# Patient Record
Sex: FEMALE | HISPANIC OR LATINO | Employment: FULL TIME | URBAN - METROPOLITAN AREA
[De-identification: names, ages, dates, MRNs, and addresses within clinical notes are randomized per-mention and may not be internally consistent; named-entity substitution may affect disease eponyms.]

---

## 2021-07-30 ENCOUNTER — TELEPHONE (OUTPATIENT)
Dept: CARDIOLOGY | Facility: MEDICAL CENTER | Age: 36
End: 2021-07-30

## 2021-07-30 NOTE — TELEPHONE ENCOUNTER
Spoke to patient in regards to obtaining records for NP appointment with [facility]. Per patient has never been treated by a previous cardiologist. Confirmed all recent notes, labs, and cardiac imaging are in [facility]. Confirmed with patient appointment time, location, and date.    Per [facility] notes EKG was done in [facility]. Request sent for all recent labs and EKG. Per pt would also like to know how much her visit would be. Informed pt message would get routed to front staff for answer.    Pending records.

## 2021-08-03 ENCOUNTER — OFFICE VISIT (OUTPATIENT)
Dept: CARDIOLOGY | Facility: MEDICAL CENTER | Age: 36
End: 2021-08-03

## 2021-08-03 VITALS
RESPIRATION RATE: 14 BRPM | DIASTOLIC BLOOD PRESSURE: 60 MMHG | HEART RATE: 73 BPM | WEIGHT: 142.7 LBS | HEIGHT: 64 IN | OXYGEN SATURATION: 100 % | SYSTOLIC BLOOD PRESSURE: 110 MMHG | BODY MASS INDEX: 24.36 KG/M2

## 2021-08-03 DIAGNOSIS — R00.2 PALPITATIONS: ICD-10-CM

## 2021-08-03 LAB — EKG IMPRESSION: NORMAL

## 2021-08-03 PROCEDURE — 93000 ELECTROCARDIOGRAM COMPLETE: CPT | Performed by: INTERNAL MEDICINE

## 2021-08-03 PROCEDURE — 99204 OFFICE O/P NEW MOD 45 MIN: CPT | Performed by: INTERNAL MEDICINE

## 2021-08-03 RX ORDER — METOPROLOL SUCCINATE 25 MG/1
25 TABLET, EXTENDED RELEASE ORAL DAILY
Qty: 30 TABLET | Refills: 11 | Status: SHIPPED | OUTPATIENT
Start: 2021-08-03

## 2021-08-03 NOTE — PROGRESS NOTES
Cardiology Initial Consultation Note    Date of note:    8/3/2021    Primary Care Provider: Sary Bydr P.A.-C.  Referring Provider: Sary Byrd P.A.-C.     Patient Name: Selena Judge   YOB: 1985  MRN:              [id number]    Chief Complaint: Palpitations    Selena Hernadez is a 36 y.o. female  patient presented today with complaints of palpitations happening for last several days.  Palpitations feels like heart racing, happens throughout the day, associated with fatigue.  She works at night, she gets poor sleep.  No syncope.  Her son has valve problem needing to check every 2 years.  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    ROS    All other systems reviewed and discussed using a comprehensive questionnaire and are negative.     Past medical history, family history, social history, allergies and labs are reviewed and updated as needed as documented below.    Past Medical History:   Diagnosis Date   • Anemia     at age 17   • Depression     currently    • First trimester bleeding 12/30/2014   • Headache(784.0)    • Hypertension 2007    High blood pressure at delivery   • Suicidal ideation 2012   • Urinary tract infection, site not specified     Hx of UTI 11/10/14         Past Surgical History:   Procedure Laterality Date   • REMOVAL OF RETAINED PLACENTA N/A 6/18/2015    Procedure: REMOVAL OF RETAINED PLACENTA;  Surgeon: Gita Cassidy M.D.;  Location: LABOR AND DELIVERY;  Service:          Current Outpatient Medications   Medication Sig Dispense Refill   • metoprolol SR (TOPROL XL) 25 MG TABLET SR 24 HR Take 1 tablet by mouth every day. 30 tablet 11   • ibuprofen (MOTRIN) 800 MG TABS Take 1 Tab by mouth every 8 hours as needed (Cramping). 30 Tab 1   • prenatal multivitamin (STUARTNATAL 1+1) 27-1 MG TABS Take 1 Tab by mouth every morning. (Patient not taking: Reported on 8/3/2021)       No current facility-administered  "medications for this visit.         No Known Allergies      History reviewed. No pertinent family history.      Social History     Socioeconomic History   • Marital status: Single     Spouse name: Not on file   • Number of children: Not on file   • Years of education: Not on file   • Highest education level: Not on file   Occupational History   • Not on file   Tobacco Use   • Smoking status: Never Smoker   • Smokeless tobacco: Never Used   Substance and Sexual Activity   • Alcohol use: No   • Drug use: No   • Sexual activity: Yes     Partners: Male     Comment: none   Other Topics Concern   • Not on file   Social History Narrative   • Not on file     Social Determinants of Health     Financial Resource Strain:    • Difficulty of Paying Living Expenses:    Food Insecurity:    • Worried About Running Out of Food in the Last Year:    • Ran Out of Food in the Last Year:    Transportation Needs:    • Lack of Transportation (Medical):    • Lack of Transportation (Non-Medical):    Physical Activity:    • Days of Exercise per Week:    • Minutes of Exercise per Session:    Stress:    • Feeling of Stress :    Social Connections:    • Frequency of Communication with Friends and Family:    • Frequency of Social Gatherings with Friends and Family:    • Attends Christianity Services:    • Active Member of Clubs or Organizations:    • Attends Club or Organization Meetings:    • Marital Status:    Intimate Partner Violence:    • Fear of Current or Ex-Partner:    • Emotionally Abused:    • Physically Abused:    • Sexually Abused:          Physical Exam:  Ambulatory Vitals  /60 (BP Location: Left arm, Patient Position: Sitting, BP Cuff Size: Adult)   Pulse 73   Resp 14   Ht 1.626 m (5' 4\")   Wt 64.7 kg (142 lb 11.2 oz)   SpO2 100%    Oxygen Therapy:  Pulse Oximetry: 100 %  BP Readings from Last 4 Encounters:   08/03/21 110/60   06/20/15 121/75   06/18/15 112/64   06/09/15 100/60       Weight/BMI: Body mass index is 24.49 " kg/m².  Wt Readings from Last 4 Encounters:   21 64.7 kg (142 lb 11.2 oz)   06/18/15 75.3 kg (166 lb)   06/18/15 74.4 kg (164 lb)   06/09/15 73 kg (161 lb)         General: Well appearing and in no apparent distress  Head: atrumatic  Eyes: No conjunctival pallor   ENT: normal external appearance of nose and ears  Neck: JVD absent, carotid bruits absent  Lungs: respiratory sounds  normal, additional breath sounds absent  Heart: Regular rhythm,   No palpable thrills on palpation, murmurs absent, no rubs,   Lower extremity edema absent.   Pedal pulses normal  Abdomen: soft, non tender, non distended.  Extremities/MSK: no clubbing, no cyanosis  Neurological: normal orientation, Gait normal   Psychiatric: Appropriate affect, intact judgement and insight  Skin: Warm extremities      Lab Data Review:  No results found for: CHOLSTRLTOT, LDL, HDL, TRIGLYCERIDE    Lab Results   Component Value Date/Time    SODIUM 135 2014 04:41 AM    POTASSIUM 3.9 2014 04:41 AM    CHLORIDE 106 2014 04:41 AM    CO2 19 (L) 2014 04:41 AM    GLUCOSE 93 2014 04:41 AM    BUN 11 2014 04:41 AM    CREATININE 0.47 (L) 2014 04:41 AM     Lab Results   Component Value Date/Time    ALKPHOSPHAT 39 2012 07:09 AM    ASTSGOT 17 2012 07:09 AM    ALTSGPT 12 2012 07:09 AM    TBILIRUBIN 0.4 2012 07:09 AM      Lab Results   Component Value Date/Time    WBC 10.9 (H) 2015 05:21 AM     Primary care physician notes reviewed.  EKG today shows normal sinus rhythm.      Medical Decision Makin-year-old female patient with history of depression, presents with palpitations.  By history her palpitations sounds like anxiety.  EKG is normal.  Recommend metoprolol succinate 25 mg daily.  If her palpitations do not improve we will do 48-hour Holter monitor.  Cardiovascular physical examination is normal today, no murmurs, I do not suspect valvular heart disease in her.    This note was dictated  using Dragon speech recognition software.    Rm DE LEON  Interventional cardiologist  [facility], [address]  [address] [address]  Phone: [phone]  Fax: [phone]

## 2023-11-24 ENCOUNTER — HOSPITAL ENCOUNTER (EMERGENCY)
Facility: MEDICAL CENTER | Age: 38
End: 2023-11-24
Attending: STUDENT IN AN ORGANIZED HEALTH CARE EDUCATION/TRAINING PROGRAM
Payer: COMMERCIAL

## 2023-11-24 VITALS
TEMPERATURE: 97.4 F | HEIGHT: 64 IN | HEART RATE: 84 BPM | DIASTOLIC BLOOD PRESSURE: 82 MMHG | BODY MASS INDEX: 25.78 KG/M2 | SYSTOLIC BLOOD PRESSURE: 123 MMHG | WEIGHT: 151.01 LBS | OXYGEN SATURATION: 98 % | RESPIRATION RATE: 16 BRPM

## 2023-11-24 DIAGNOSIS — S61.311A LACERATION OF LEFT INDEX FINGER WITHOUT FOREIGN BODY WITH DAMAGE TO NAIL, INITIAL ENCOUNTER: ICD-10-CM

## 2023-11-24 PROCEDURE — 700111 HCHG RX REV CODE 636 W/ 250 OVERRIDE (IP): Performed by: STUDENT IN AN ORGANIZED HEALTH CARE EDUCATION/TRAINING PROGRAM

## 2023-11-24 PROCEDURE — 304217 HCHG IRRIGATION SYSTEM

## 2023-11-24 PROCEDURE — 90715 TDAP VACCINE 7 YRS/> IM: CPT | Performed by: STUDENT IN AN ORGANIZED HEALTH CARE EDUCATION/TRAINING PROGRAM

## 2023-11-24 PROCEDURE — 99282 EMERGENCY DEPT VISIT SF MDM: CPT

## 2023-11-24 PROCEDURE — 700101 HCHG RX REV CODE 250: Performed by: STUDENT IN AN ORGANIZED HEALTH CARE EDUCATION/TRAINING PROGRAM

## 2023-11-24 PROCEDURE — 90471 IMMUNIZATION ADMIN: CPT

## 2023-11-24 RX ORDER — CEPHALEXIN 500 MG/1
500 CAPSULE ORAL 2 TIMES DAILY
Qty: 6 CAPSULE | Refills: 0 | Status: ACTIVE | OUTPATIENT
Start: 2023-11-24 | End: 2023-11-27

## 2023-11-24 RX ADMIN — LIDOCAINE HYDROCHLORIDE 10 ML: 10 INJECTION, SOLUTION INFILTRATION; PERINEURAL at 21:00

## 2023-11-24 RX ADMIN — CLOSTRIDIUM TETANI TOXOID ANTIGEN (FORMALDEHYDE INACTIVATED), CORYNEBACTERIUM DIPHTHERIAE TOXOID ANTIGEN (FORMALDEHYDE INACTIVATED), BORDETELLA PERTUSSIS TOXOID ANTIGEN (GLUTARALDEHYDE INACTIVATED), BORDETELLA PERTUSSIS FILAMENTOUS HEMAGGLUTININ ANTIGEN (FORMALDEHYDE INACTIVATED), BORDETELLA PERTUSSIS PERTACTIN ANTIGEN, AND BORDETELLA PERTUSSIS FIMBRIAE 2/3 ANTIGEN 0.5 ML: 5; 2; 2.5; 5; 3; 5 INJECTION, SUSPENSION INTRAMUSCULAR at 21:08

## 2023-11-24 NOTE — LETTER
"  FORM C-4:  EMPLOYEE’S CLAIM FOR COMPENSATION/ REPORT OF INITIAL TREATMENT  EMPLOYEE’S CLAIM - PROVIDE ALL INFORMATION REQUESTED   First Name Selena Last Name Wesley Hernadez Birthdate 1985  Sex female Claim Number   Home Address [address]   [place]             [place] [address]                                   Age  38 y.o. Height  1.626 m (5' 4\") Weight  68.5 kg (151 lb 0.2 oz) [facility]     Mailing Address 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 (if applicable) Cutting Edouard Bits    How did this injury or occupational disease occur? Be specific and answer in detail. 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   If you believe that you have an occupational disease, when did you first have knowledge of the disability and it relationship to your employment? N/A Witnesses to the Accident  None   Nature of Injury or Occupational Disease  Laceration Part(s) of Body Injured or Affected  Finger (L), N/A, N/A    I CERTIFY THAT THE ABOVE IS TRUE AND CORRECT TO THE BEST OF MY KNOWLEDGE AND THAT I HAVE PROVIDED THIS INFORMATION IN ORDER TO OBTAIN THE " BENEFITS OF [place]).  I HEREBY AUTHORIZE ANY PHYSICIAN, CHIROPRACTOR, SURGEON, PRACTITIONER, OR OTHER PERSON, ANY HOSPITAL, INCLUDING [facility] OR [facility], ANY MEDICAL SERVICE ORGANIZATION, ANY INSURANCE COMPANY, OR OTHER INSTITUTION OR ORGANIZATION TO RELEASE TO EACH OTHER, ANY MEDICAL OR OTHER INFORMATION, INCLUDING BENEFITS PAID OR PAYABLE, PERTINENT TO THIS INJURY OR DISEASE, EXCEPT INFORMATION RELATIVE TO DIAGNOSIS, TREATMENT AND/OR COUNSELING FOR AIDS, PSYCHOLOGICAL CONDITIONS, ALCOHOL OR CONTROLLED SUBSTANCES, FOR WHICH I MUST GIVE SPECIFIC AUTHORIZATION.  A PHOTOSTAT OF THIS AUTHORIZATION SHALL BE AS VALID AS THE ORIGINAL.  Date 11/24/2023                      [facility] ED                                                  Employee’s Signature   THIS REPORT MUST BE COMPLETED AND MAILED WITHIN 3 WORKING DAYS OF TREATMENT   [facility], EMERGENCY DEPT                       Name of Facility [facility]   Date  11/24/2023 Diagnosis  (S61.311A) Laceration of left index finger without foreign body with damage to nail, initial encounter Is there evidence the injured employee was under the influence of alcohol and/or another controlled substance at the time of accident?   Hour  9:20 PM Description of Injury or Disease  Laceration of left index finger without foreign body with damage to nail, initial encounter No   Treatment  Irrigation, wound dressing, antibiotic prescription  Have you advised the patient to remain off work five days or more?         No   X-Ray Findings    If Yes   From Date    To Date      From information given by the employee, together with medical evidence, can you directly connect this injury or occupational disease as job incurred? Yes If No, is employee capable of: Full Duty  No Modified Duty  Yes   Is  "additional medical care by a physician indicated? Yes If Modified Duty, Specify any Limitations / Restrictions   Patient must wear protective waterproof gloves until wound has healed   Do you know of any previous injury or disease contributing to this condition or occupational disease? No    Date 11/24/2023 Print Doctor’s Name Herrera Carlton STONE certify the employer’s copy of this form was mailed on:   Address [address] [facility] [address] [facility] [facility] [place] [id number] [phone] INSURER’S USE ONLY   Provider’s Tax ID Number [id number] Telephone Dept: [phone]    Doctor’s Signature e-CARLTON Gee M.D. Degree  MD      Form C-4 (rev.10/07)                                                                         BRIEF DESCRIPTION OF RIGHTS AND BENEFITS  (Pursuant to [place] 616[place].050)    Notice of Injury or Occupational Disease (Incident Report Form C-1): If an injury or occupational disease (OD) arises out of and in the course of employment, you must provide written notice to your employer as soon as practicable, but no later than 7 days after the accident or OD. Your employer shall maintain a sufficient supply of the required forms.    Claim for Compensation (Form C-4): If medical treatment is sought, the form C-4 is available at the place of initial treatment. A completed \"Claim for Compensation\" (Form C-4) must be filed within 90 days after an accident or OD. The treating physician or chiropractor must, within 3 working days after treatment, complete and mail to the employer, the employer's insurer and third-party , the Claim for Compensation.    Medical Treatment: If you require medical treatment for your on-the-job injury or OD, you may be required to select a physician or chiropractor from a list provided by your workers’ compensation insurer, if it has contracted with an Organization for Managed Care (MCO) or Preferred Provider Organization (PPO) or providers of health care. If your employer has not " entered into a contract with an MCO or PPO, you may select a physician or chiropractor from the Panel of Physicians and Chiropractors. Any medical costs related to your industrial injury or OD will be paid by your insurer.    Temporary Total Disability (TTD): If your doctor has certified that you are unable to work for a period of at least 5 consecutive days, or 5 cumulative days in a 20-day period, or places restrictions on you that your employer does not accommodate, you may be entitled to TTD compensation.    Temporary Partial Disability (TPD): If the wage you receive upon reemployment is less than the compensation for TTD to which you are entitled, the insurer may be required to pay you TPD compensation to make up the difference. TPD can only be paid for a maximum of 24 months.    Permanent Partial Disability (PPD): When your medical condition is stable and there is an indication of a PPD as a result of your injury or OD, within 30 days, your insurer must arrange for an evaluation by a rating physician or chiropractor to determine the degree of your PPD. The amount of your PPD award depends on the date of injury, the results of the PPD evaluation, your age and wage.    Permanent Total Disability (PTD): If you are medically certified by a treating physician or chiropractor as permanently and totally disabled and have been granted a PTD status by your insurer, you are entitled to receive monthly benefits not to exceed 66 2/3% of your average monthly wage. The amount of your PTD payments is subject to reduction if you previously received a lump-sum PPD award.    Vocational Rehabilitation Services: You may be eligible for vocational rehabilitation services if you are unable to return to the job due to a permanent physical impairment or permanent restrictions as a result of your injury or occupational disease.    Transportation and Per Liliya Reimbursement: You may be eligible for travel expenses and per liliya  associated with medical treatment.    Reopening: You may be able to reopen your claim if your condition worsens after claim closure.     Appeal Process: If you disagree with a written determination issued by the insurer or the insurer does not respond to your request, you may appeal to the Department of Administration, , by following the instructions contained in your determination letter. You must appeal the determination within 70 days from the date of the determination letter at [address], [address], [place], [place] [address], or [address], [address], [place], [place] [address]. If you disagree with the  decision, you may appeal to the Department of Administration, . You must file your appeal within 30 days from the date of the  decision letter at [address], [address], [place], [place] [address], or [address], [address] [facility] [address], [place], [place] [address]. If you disagree with a decision of an , you may file a petition for judicial review with the District Court. You must do so within 30 days of the Appeal Officer’s decision. You may be represented by an  at your own expense or you may contact the [facility] for possible representation.    [place]  for Injured Workers (NAIW): If you disagree with a  decision, you may request that NAIW represent you without charge at an  Hearing. For information regarding denial of benefits, you may contact the [facility] at: [address], [address], [place] [address], [phone], or [address], [address], [place] [address], [phone]    To File a Complaint with the Division: If you wish to file a complaint with the  of the Division of Industrial Relations (DIR),  please contact the Workers’ Compensation Section, [address], [address], [place], [place] [address], telephone [phone]  [phone], or [address], [address], [place], [place] [address], telephone [phone].    For assistance with Workers’ Compensation Issues: You may contact the [facility] Office for Consumer Health Assistance, [address], [address], [place], [place] [address], Toll Free 1-226.246.2356, Web site: http://[facility].[place].gov/Programs/QUAN E-mail: quan@[facility].[place].gov  D-2 (rev. 10/20)              __________________________________________________________________                                   11/24/2023            Employee Name / Signature                                                                                                                            Date

## 2023-11-25 ENCOUNTER — EH NON-PROVIDER (OUTPATIENT)
Dept: OCCUPATIONAL MEDICINE | Facility: CLINIC | Age: 38
End: 2023-11-25
Payer: COMMERCIAL

## 2023-11-25 DIAGNOSIS — Z02.83 ENCOUNTER FOR DRUG SCREENING: ICD-10-CM

## 2023-11-25 PROCEDURE — 80305 DRUG TEST PRSMV DIR OPT OBS: CPT | Performed by: NURSE PRACTITIONER

## 2023-11-25 PROCEDURE — 99026 IN-HOSPITAL ON CALL SERVICE: CPT | Performed by: NURSE PRACTITIONER

## 2023-11-25 PROCEDURE — 82075 ASSAY OF BREATH ETHANOL: CPT | Performed by: NURSE PRACTITIONER

## 2023-11-25 NOTE — ED TRIAGE NOTES
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 Washed and gauze dressing applied. No shadowing evident. Doesn't believe she has had a recent tetanus vaccine. Utilized  service for triage process.

## 2023-11-25 NOTE — ED NOTES
Pt provided with wound care education. Pt declines questions about follow up. Pt ambulated out of Er with  no questions.

## 2023-11-25 NOTE — ED PROVIDER NOTES
"ED Provider Note    CHIEF COMPLAINT  Chief Complaint   Patient presents with    Laceration       HPI/ROS  LIMITATION TO HISTORY   Select: : None  OUTSIDE HISTORIAN(S):      Selena Hernadez is a 38 y.o. female who presents with a laceration to the distal tip of her index finger on her left hand.  Patient reports she was cutting raw luna.  Patient reports she is not up-to-date on tetanus.  Patient denies weakness or numbness to the affected digit.  Patient denies any other injury.    PAST MEDICAL HISTORY   has a past medical history of Anemia, Depression, First trimester bleeding (12/30/2014), Headache(784.0), Hypertension (2007), Suicidal ideation (2012), and Urinary tract infection, site not specified.    SURGICAL HISTORY   has a past surgical history that includes removal of retained placenta (N/A, 6/18/2015).    FAMILY HISTORY  No family history on file.    SOCIAL HISTORY  Social History     Tobacco Use    Smoking status: Never    Smokeless tobacco: Never   Substance and Sexual Activity    Alcohol use: No    Drug use: No    Sexual activity: Yes     Partners: Male     Comment: none       CURRENT MEDICATIONS  Home Medications       Reviewed by Sangeetha Coto R.N. (Registered Nurse) on 11/24/23 at 1945  Med List Status: Not Addressed     Medication Last Dose Status   ibuprofen (MOTRIN) 800 MG TABS  Active   metoprolol SR (TOPROL XL) 25 MG TABLET SR 24 HR  Active                    ALLERGIES  No Known Allergies    PHYSICAL EXAM  VITAL SIGNS: /77   Pulse 82   Temp 36.3 °C (97.4 °F) (Temporal)   Resp 19   Ht 1.626 m (5' 4\")   Wt 68.5 kg (151 lb 0.2 oz)   SpO2 99%   BMI 25.92 kg/m²    Vitals and nursing note reviewed.   Constitutional:       Comments: Patient is lying in bed supine, pleasant, conversant, speaking in complete sentences   HENT:      Head: Normocephalic and atraumatic.   Eyes:      Extraocular Movements: Extraocular movements intact.      Conjunctiva/sclera: Conjunctivae normal.    "   Pupils: Pupils are equal, round, and reactive to light.   Cardiovascular:      Pulses: Normal pulses.      Comments: HR 82  Pulmonary:      Effort: Pulmonary effort is normal. No respiratory distress.   Musculoskeletal:      Avulsion, laceration to the radial aspect of the tip of the index finger.  Flexion/extension intact at the MCP, PIP, DIP.  Sensation to light touch grossly intact distally.  Partial nail avulsion as well.     Cervical back: Normal range of motion. No rigidity.   Skin:     General: Skin is warm and dry.      Capillary Refill: Capillary refill takes less than 2 seconds.   Neurological:      Mental Status: Alert.         COURSE & MEDICAL DECISION MAKING      INITIAL ASSESSMENT, COURSE AND PLAN  Care Narrative: No evidence of bony tenderness or any other injury requiring x-ray imaging.  No evidence of osseous abnormality.  No neurovascular deficits.  No evidence of tendon rupture.  Patient's wound has been irrigated extensively due to vomiting exposure.  Patient has been given antibiotic prophylaxis as well.  Tetanus updated.  Patient counseled to return for worsening pain, swelling, redness, drainage.    Repeat physical exam benign.  I doubt any serious emergency process at this time.  Patient and/or family, friends given strict return precautions for worsening symptoms and care instructions. They have demonstrated understanding of discharge instructions through teach back mechanism. Advised PCP follow-up in 1-2 days.  Patient/family/friend expresses understanding and agrees to plan.    This dictation has been created using voice recognition software. I am continuously working with the software to minimize the number of voice recognition errors and I have made every attempt to manually correct the errors within my dictation. However errors  related to this voice recognition software may still exist and should be interpreted within the appropriate context.     Electronically signed by: Andi  LUANA Silverman M.D., 11/24/2023 9:14 PM    DISPOSITION AND DISCUSSIONS    Escalation of care considered, and ultimately not performed:diagnostic imaging      FINAL DIAGNOSIS  1. Laceration of left ring finger without foreign body with damage to nail, initial encounter           Electronically signed by: Andi Silverman M.D., 11/24/2023 9:12 PM

## 2023-11-27 LAB
AMP AMPHETAMINE: NORMAL
BAR BARBITURATES: NORMAL
BREATH ALCOHOL COMMENT: NORMAL
BZO BENZODIAZEPINES: NORMAL
COC COCAINE: NORMAL
INT CON NEG: NORMAL
INT CON POS: NORMAL
MDMA ECSTASY: NORMAL
MET METHAMPHETAMINES: NORMAL
MTD METHADONE: NORMAL
OPI OPIATES: NORMAL
OXY OXYCODONE: NORMAL
PCP PHENCYCLIDINE: NORMAL
POC BREATHALIZER: 0 PERCENT (ref 0–0.01)
POC URINE DRUG SCREEN OCDRS: NEGATIVE
THC: NORMAL